# Patient Record
Sex: MALE | URBAN - METROPOLITAN AREA
[De-identification: names, ages, dates, MRNs, and addresses within clinical notes are randomized per-mention and may not be internally consistent; named-entity substitution may affect disease eponyms.]

---

## 2022-10-27 ENCOUNTER — OFFICE VISIT (OUTPATIENT)
Dept: URBAN - METROPOLITAN AREA CLINIC 22 | Facility: CLINIC | Age: 57
End: 2022-10-27
Payer: COMMERCIAL

## 2022-10-27 DIAGNOSIS — S05.92XA INJURY OF LEFT EYE, INITIAL ENCOUNTER: Primary | ICD-10-CM

## 2022-10-27 DIAGNOSIS — H11.32 SUBCONJUNCTIVAL HEMORRHAGE OF LEFT EYE: ICD-10-CM

## 2022-10-27 PROCEDURE — 92134 CPTRZ OPH DX IMG PST SGM RTA: CPT | Performed by: STUDENT IN AN ORGANIZED HEALTH CARE EDUCATION/TRAINING PROGRAM

## 2022-10-27 PROCEDURE — 99203 OFFICE O/P NEW LOW 30 MIN: CPT | Performed by: STUDENT IN AN ORGANIZED HEALTH CARE EDUCATION/TRAINING PROGRAM

## 2022-10-27 ASSESSMENT — VISUAL ACUITY
OS: 20/20
OD: 20/25

## 2022-10-27 ASSESSMENT — INTRAOCULAR PRESSURE
OD: 14
OD: 16
OS: 13

## 2022-10-27 NOTE — IMPRESSION/PLAN
Impression: Subconjunctival hemorrhage of left eye: H11.32. Plan: See #1. Condition is self-resolving. Rx artificial tears PRN.

## 2022-10-27 NOTE — IMPRESSION/PLAN
Impression: Injury of left eye, initial encounter: S05.92xA. Plan: Discussed today's findings. [address] OS, ?mild commotio retinae OS. Retina intact 360 OU. Retinal findings correlate w/ patient's subjective complaint. RTC in 1 mo for follow-up (DFE and OCT macula). Contact clinic sooner if symptoms worsen. Reviewed s/sx of RD. Patient is awaiting results of CT head. Likely no orbital fractures given today's testing & unrestricted EOMs, but patient instructed to contact clinic ASAP if CT indicates orbital fractures.

## 2022-12-02 ENCOUNTER — OFFICE VISIT (OUTPATIENT)
Dept: URBAN - METROPOLITAN AREA CLINIC 23 | Facility: CLINIC | Age: 57
End: 2022-12-02
Payer: COMMERCIAL

## 2022-12-02 DIAGNOSIS — S05.8X2D OTHER INJURIES OF LEFT EYE AND ORBIT, SUBSEQUENT ENCOUNTER: Primary | ICD-10-CM

## 2022-12-02 PROCEDURE — 99213 OFFICE O/P EST LOW 20 MIN: CPT | Performed by: OPTOMETRIST

## 2022-12-02 ASSESSMENT — KERATOMETRY
OD: 42.50
OS: 43.00

## 2022-12-02 ASSESSMENT — INTRAOCULAR PRESSURE
OD: 17
OS: 17

## 2022-12-02 NOTE — IMPRESSION/PLAN
Impression: Other injuries of left eye and orbit, subsequent encounter: S05.8X2D. Plan: Pt edu on all findings. Mild pigment changes to fovea OS. Pt has subjective improvement. RTC 2 mo for MAC OCT and 24-2 vf to map vision loss. RTC sooner if vision worsens. Pt may return for refraction prn.

## 2023-02-03 ENCOUNTER — OFFICE VISIT (OUTPATIENT)
Dept: URBAN - METROPOLITAN AREA CLINIC 23 | Facility: CLINIC | Age: 58
End: 2023-02-03
Payer: COMMERCIAL

## 2023-02-03 DIAGNOSIS — S05.8X2D OTHER INJURIES OF LEFT EYE AND ORBIT, SUBSEQUENT ENCOUNTER: Primary | ICD-10-CM

## 2023-02-03 PROCEDURE — 92134 CPTRZ OPH DX IMG PST SGM RTA: CPT | Performed by: OPTOMETRIST

## 2023-02-03 PROCEDURE — 99213 OFFICE O/P EST LOW 20 MIN: CPT | Performed by: OPTOMETRIST

## 2023-02-03 ASSESSMENT — INTRAOCULAR PRESSURE
OS: 15
OD: 17

## 2023-02-03 ASSESSMENT — KERATOMETRY
OS: 42.63
OD: 42.63

## 2023-02-03 NOTE — IMPRESSION/PLAN
Impression: Other injuries of left eye and orbit, subsequent encounter: S05.8X2D. Plan: Pt edu on all findings. MAC OCT is stable. Edu that retinal injury can cause irreversible vision changes. Monitor yearly with DFE and OCT.